# Patient Record
Sex: MALE | Race: BLACK OR AFRICAN AMERICAN | NOT HISPANIC OR LATINO | URBAN - METROPOLITAN AREA
[De-identification: names, ages, dates, MRNs, and addresses within clinical notes are randomized per-mention and may not be internally consistent; named-entity substitution may affect disease eponyms.]

---

## 2023-02-25 ENCOUNTER — EMERGENCY (EMERGENCY)
Facility: HOSPITAL | Age: 53
LOS: 1 days | Discharge: LEFT BEFORE DISCHARGE | End: 2023-02-25
Admitting: EMERGENCY MEDICINE
Payer: SELF-PAY

## 2023-02-25 VITALS
RESPIRATION RATE: 18 BRPM | HEART RATE: 96 BPM | TEMPERATURE: 98 F | WEIGHT: 220.02 LBS | SYSTOLIC BLOOD PRESSURE: 165 MMHG | OXYGEN SATURATION: 100 % | HEIGHT: 70 IN | DIASTOLIC BLOOD PRESSURE: 110 MMHG

## 2023-02-25 PROCEDURE — L9991: CPT

## 2023-02-25 NOTE — ED ADULT NURSE NOTE - ED_CALLED_NO_RESPONSE_NOTE 3
pt was brought to south side, showed to room C, was seen walking down [address], asked pt if he was looking for bathroom he said yes, directed him to bathroom, walked out after, not seen, called, no answer.

## 2023-02-25 NOTE — ED ADULT TRIAGE NOTE - ARRIVAL INFO ADDITIONAL COMMENTS
Pt to ED c/o Lower back pain X2 days. Denies fall/ trauma. "I have been walking a lot". Un-domiciled. Noted to be hypertensive. "I am supposed to take blood pressure medication but I cant get it". Denies chest pain, dizziness, sob, visual changes.

## 2023-02-26 DIAGNOSIS — Z53.21 PROCEDURE AND TREATMENT NOT CARRIED OUT DUE TO PATIENT LEAVING PRIOR TO BEING SEEN BY HEALTH CARE PROVIDER: ICD-10-CM

## 2023-02-26 DIAGNOSIS — M54.9 DORSALGIA, UNSPECIFIED: ICD-10-CM
